# Patient Record
Sex: MALE | Race: WHITE | Employment: UNEMPLOYED | URBAN - NONMETROPOLITAN AREA
[De-identification: names, ages, dates, MRNs, and addresses within clinical notes are randomized per-mention and may not be internally consistent; named-entity substitution may affect disease eponyms.]

---

## 2018-03-29 ENCOUNTER — HOSPITAL ENCOUNTER (OUTPATIENT)
Dept: INTERVENTIONAL RADIOLOGY/VASCULAR | Age: 58
Discharge: HOME | End: 2018-03-29
Payer: COMMERCIAL

## 2018-03-29 VITALS
OXYGEN SATURATION: 97 % | HEIGHT: 71 IN | HEART RATE: 66 BPM | BODY MASS INDEX: 26.88 KG/M2 | WEIGHT: 192 LBS | TEMPERATURE: 97.8 F | SYSTOLIC BLOOD PRESSURE: 140 MMHG | RESPIRATION RATE: 18 BRPM | DIASTOLIC BLOOD PRESSURE: 96 MMHG

## 2018-03-29 PROCEDURE — 2500000003 HC RX 250 WO HCPCS

## 2018-03-29 PROCEDURE — 6360000004 HC RX CONTRAST MEDICATION: Performed by: RADIOLOGY

## 2018-03-29 PROCEDURE — 62321 NJX INTERLAMINAR CRV/THRC: CPT

## 2018-03-29 PROCEDURE — 6360000002 HC RX W HCPCS

## 2018-03-29 RX ORDER — TRAMADOL HYDROCHLORIDE 50 MG/1
50 TABLET ORAL NIGHTLY
COMMUNITY

## 2018-03-29 RX ORDER — CYCLOBENZAPRINE HCL 5 MG
5 TABLET ORAL DAILY
COMMUNITY

## 2018-03-29 RX ORDER — METHYLPREDNISOLONE ACETATE 80 MG/ML
80 INJECTION, SUSPENSION INTRA-ARTICULAR; INTRALESIONAL; INTRAMUSCULAR; SOFT TISSUE ONCE
Status: COMPLETED | OUTPATIENT
Start: 2018-03-29 | End: 2018-03-29

## 2018-03-29 RX ADMIN — METHYLPREDNISOLONE ACETATE 80 MG: 80 INJECTION, SUSPENSION INTRA-ARTICULAR; INTRALESIONAL; INTRAMUSCULAR; SOFT TISSUE at 08:44

## 2018-03-29 RX ADMIN — Medication 1 ML: at 08:44

## 2018-03-29 RX ADMIN — IOHEXOL 1 ML: 180 INJECTION INTRAVENOUS at 08:44

## 2018-03-29 ASSESSMENT — PAIN - FUNCTIONAL ASSESSMENT: PAIN_FUNCTIONAL_ASSESSMENT: 0-10

## 2018-03-29 ASSESSMENT — PAIN SCALES - GENERAL: PAINLEVEL_OUTOF10: 5

## 2018-03-29 NOTE — PROGRESS NOTES
Patient in recovery, vitals are stable. He denies any new pain or numbness/tingling in his extremities. Injection site band-aid is dry and intact.

## 2018-03-29 NOTE — PROGRESS NOTES
9784 Patient received in IR for cervical epidural injection. Pt reports constant \"6\"/20 numbness. He states there is pain bilaterally with numbness in the bilateral hands. 7240 This procedure has been fully reviewed with the patient and written informed consent has been obtained. 0046 Procedure started with  [address] 7011 Procedure completed; patient tolerated well. Band aid to upper back/lower neck; no bleeding noted  4995 Patient on cart; comfort ensured. Pt has strong and equal bilateral hand grasps and pedal push/pulls. 3356 Patient taken to OPN via cart.

## 2019-06-07 ENCOUNTER — TELEPHONE (OUTPATIENT)
Dept: FAMILY MEDICINE CLINIC | Age: 59
End: 2019-06-07